# Patient Record
Sex: MALE | URBAN - METROPOLITAN AREA
[De-identification: names, ages, dates, MRNs, and addresses within clinical notes are randomized per-mention and may not be internally consistent; named-entity substitution may affect disease eponyms.]

---

## 2021-05-07 ENCOUNTER — OFFICE VISIT (OUTPATIENT)
Dept: URBAN - METROPOLITAN AREA CLINIC 68 | Facility: CLINIC | Age: 46
End: 2021-05-07
Payer: COMMERCIAL

## 2021-05-07 DIAGNOSIS — H25.13 AGE-RELATED NUCLEAR CATARACT, BILATERAL: ICD-10-CM

## 2021-05-07 PROCEDURE — 92134 CPTRZ OPH DX IMG PST SGM RTA: CPT | Performed by: OPHTHALMOLOGY

## 2021-05-07 PROCEDURE — 99204 OFFICE O/P NEW MOD 45 MIN: CPT | Performed by: OPHTHALMOLOGY

## 2021-05-07 ASSESSMENT — INTRAOCULAR PRESSURE
OD: 10
OS: 13
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
## 2021-06-04 ENCOUNTER — OFFICE VISIT (OUTPATIENT)
Dept: URBAN - METROPOLITAN AREA CLINIC 68 | Facility: CLINIC | Age: 46
End: 2021-06-04
Payer: COMMERCIAL

## 2021-06-04 DIAGNOSIS — H04.123 DRY EYE SYNDROME OF BILATERAL LACRIMAL GLANDS: ICD-10-CM

## 2021-06-04 DIAGNOSIS — H31.012 MACULA SCAR OF POST POLE OF LEFT EYE: Primary | ICD-10-CM

## 2021-06-04 PROCEDURE — 99214 OFFICE O/P EST MOD 30 MIN: CPT | Performed by: OPHTHALMOLOGY

## 2021-06-04 ASSESSMENT — INTRAOCULAR PRESSURE
OS: 13
OD: 10
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
## 2022-05-09 ENCOUNTER — OFFICE VISIT (OUTPATIENT)
Dept: URBAN - METROPOLITAN AREA CLINIC 72 | Facility: CLINIC | Age: 47
End: 2022-05-09
Payer: COMMERCIAL

## 2022-05-09 DIAGNOSIS — H04.123 DRY EYE SYNDROME OF BILATERAL LACRIMAL GLANDS: Primary | ICD-10-CM

## 2022-05-09 DIAGNOSIS — H31.012 MACULA SCAR OF POST POLE OF LEFT EYE: ICD-10-CM

## 2022-05-09 DIAGNOSIS — H25.013 CORTICAL AGE-RELATED CATARACT, BILATERAL: ICD-10-CM

## 2022-05-09 PROCEDURE — 92004 COMPRE OPH EXAM NEW PT 1/>: CPT | Performed by: OPTOMETRIST

## 2022-05-09 ASSESSMENT — INTRAOCULAR PRESSURE
OS: 14
OD: 15

## 2022-05-09 NOTE — IMPRESSION/PLAN
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 Plan: Pt has previously seen Dr Bennett Live, no treatment is being issued. Recommend yearly DFE and OCT for monitoring Pt understands

## 2022-11-15 ENCOUNTER — OFFICE VISIT (OUTPATIENT)
Dept: URBAN - METROPOLITAN AREA CLINIC 71 | Facility: CLINIC | Age: 47
End: 2022-11-15
Payer: COMMERCIAL

## 2022-11-15 DIAGNOSIS — T15.02XA FOREIGN BODY IN CORNEA, LEFT EYE, INITIAL ENCOUNTER: Primary | ICD-10-CM

## 2022-11-15 DIAGNOSIS — H25.013 CORTICAL AGE-RELATED CATARACT, BILATERAL: ICD-10-CM

## 2022-11-15 PROCEDURE — 99212 OFFICE O/P EST SF 10 MIN: CPT

## 2022-11-15 RX ORDER — CIPROFLOXACIN HYDROCHLORIDE 3 MG/ML
0.3 % SOLUTION/ DROPS OPHTHALMIC
Qty: 5 | Refills: 0 | Status: INACTIVE
Start: 2022-11-15 | End: 2022-11-16

## 2022-11-15 ASSESSMENT — INTRAOCULAR PRESSURE
OD: 12
OS: 10
OS: 9

## 2022-11-15 NOTE — IMPRESSION/PLAN
Impression: Foreign body in cornea, left eye, initial encounter: T15.02xA. Metallic. Dr. Mary Wolfe also assessed pt today. Slight leak of area where foreign body was removed, more apparent with BCL on. Plan: Discussed findings with pt. One drop of each Ofloxacin and Proparicaine instilled OS, and foreign body removed from left eye in office today. Algerbrush used to remove remaining rust today. Pt tolerated well. BCL placed OS in office. Pt to START Ciprofloxacin TID OS x 5 days. Rx sent to pharmacy. Will follow-up in 1 day with Dr. Gabriella Stern.

## 2022-11-16 ENCOUNTER — OFFICE VISIT (OUTPATIENT)
Dept: URBAN - METROPOLITAN AREA CLINIC 72 | Facility: CLINIC | Age: 47
End: 2022-11-16
Payer: COMMERCIAL

## 2022-11-16 DIAGNOSIS — T15.02XS FOREIGN BODY IN CORNEA, LEFT EYE, SEQUELA: Primary | ICD-10-CM

## 2022-11-16 PROCEDURE — 92012 INTRM OPH EXAM EST PATIENT: CPT

## 2022-11-16 ASSESSMENT — INTRAOCULAR PRESSURE
OS: 12
OD: 10

## 2022-11-16 NOTE — IMPRESSION/PLAN
Impression: Foreign body in cornea, left eye, sequela: T15.02xS. BCL in place OS 
1 gtt proparicaine instilled before removal of BCL BCL removed in clinic today without complications Plan: pt is doing well, and has been compliant with gtt regimen. Recommend pt to finish antibiotic gtt course, 1gtt BID x 3 more days then D/c 
pt to use AFT for comfort.

## 2023-04-27 ENCOUNTER — OFFICE VISIT (OUTPATIENT)
Dept: URBAN - METROPOLITAN AREA CLINIC 72 | Facility: CLINIC | Age: 48
End: 2023-04-27
Payer: COMMERCIAL

## 2023-04-27 DIAGNOSIS — H25.813 COMBINED FORMS OF AGE-RELATED CATARACT, BILATERAL: ICD-10-CM

## 2023-04-27 DIAGNOSIS — H31.012 MACULA SCAR OF POST POLE OF LEFT EYE: Primary | ICD-10-CM

## 2023-04-27 DIAGNOSIS — H54.52A2: ICD-10-CM

## 2023-04-27 PROCEDURE — 92014 COMPRE OPH EXAM EST PT 1/>: CPT | Performed by: OPTOMETRIST

## 2023-04-27 ASSESSMENT — INTRAOCULAR PRESSURE
OS: 14
OD: 13
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
